# Patient Record
(demographics unavailable — no encounter records)

---

## 2025-03-24 NOTE — END OF VISIT
[FreeTextEntry3] : I, Ramesh Swift solely acted as scribe for Dr. Diane Conway on 03/24/2025  All medical entries made by the Scribe were at my, Dr. Tejada, direction and personally dictated by me on 03/24/2025 . I have reviewed the chart and agree that the record accurately reflects my personal performance of the history, physical exam, assessment and plan. I have also personally directed, reviewed, and agreed with the chart.

## 2025-03-24 NOTE — PROCEDURE
[Colposcopy] : Colposcopy  [Consent Obtained] : Consent obtained [Risks] : risks [Benefits] : benefits [Patient] : patient [Infection] : infection [Bleeding] : bleeding [Allergic Reaction] : allergic reaction [LGSIL] : LGSIL [HPV High Risk] : HPV high risk [Colposcopy Adequate] : colposcopy adequate [SCI Fully Visualized] : SCI fully visualized [ECC Performed] : ECC performed [Hemostasis Obtained] : Hemostasis obtained [Tolerated Well] : the patient tolerated the procedure well [Biopsy] : biopsy taken [de-identified] : 1 [de-identified] : 12 o'clock [de-identified] : With silver nitrate.  [de-identified] : Will call with colpo results.  Repeat pap in one year.

## 2025-03-24 NOTE — HISTORY OF PRESENT ILLNESS
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

## 2025-03-24 NOTE — PROCEDURE
[Colposcopy] : Colposcopy  [Consent Obtained] : Consent obtained [Risks] : risks [Benefits] : benefits [Patient] : patient [Infection] : infection [Bleeding] : bleeding [Allergic Reaction] : allergic reaction [LGSIL] : LGSIL [HPV High Risk] : HPV high risk [Colposcopy Adequate] : colposcopy adequate [SCI Fully Visualized] : SCI fully visualized [ECC Performed] : ECC performed [Hemostasis Obtained] : Hemostasis obtained [Tolerated Well] : the patient tolerated the procedure well [Biopsy] : biopsy taken [de-identified] : 1 [de-identified] : 12 o'clock [de-identified] : With silver nitrate.  [de-identified] : Will call with colpo results.  Repeat pap in one year.

## 2025-03-24 NOTE — HISTORY OF PRESENT ILLNESS
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

## 2025-03-24 NOTE — PLAN
[FreeTextEntry1] : Colposcopy was done today. Cervical biopsy at 12 o'clock. ECC performed. Hemostasis was observed with silver nitrate. Patient tolerated the procedure well with no complications. Post-procedure instructions were given. Patient expressed understanding. All questions were answered.  F/u pap in one year or as needed.